# Patient Record
Sex: MALE | Race: WHITE
[De-identification: names, ages, dates, MRNs, and addresses within clinical notes are randomized per-mention and may not be internally consistent; named-entity substitution may affect disease eponyms.]

---

## 2019-04-16 ENCOUNTER — HOSPITAL ENCOUNTER (INPATIENT)
Dept: HOSPITAL 74 - FER | Age: 34
LOS: 1 days | Discharge: HOME | DRG: 343 | End: 2019-04-17
Payer: COMMERCIAL

## 2019-04-16 VITALS — BODY MASS INDEX: 32.1 KG/M2

## 2019-04-16 DIAGNOSIS — R11.0: ICD-10-CM

## 2019-04-16 DIAGNOSIS — R10.31: ICD-10-CM

## 2019-04-16 DIAGNOSIS — K35.30: Primary | ICD-10-CM

## 2019-04-16 DIAGNOSIS — R63.0: ICD-10-CM

## 2019-04-16 LAB
ALBUMIN SERPL-MCNC: 4.4 G/DL (ref 3.4–5)
ALP SERPL-CCNC: 72 U/L (ref 45–117)
ALT SERPL-CCNC: 86 U/L (ref 13–61)
ANION GAP SERPL CALC-SCNC: 6 MMOL/L (ref 8–16)
APTT BLD: 30.5 SECONDS (ref 25.2–36.5)
AST SERPL-CCNC: 36 U/L (ref 15–37)
BASOPHILS # BLD: 0.2 % (ref 0–2)
BILIRUB SERPL-MCNC: 1.4 MG/DL (ref 0.2–1)
BUN SERPL-MCNC: 12 MG/DL (ref 7–18)
CALCIUM SERPL-MCNC: 8.9 MG/DL (ref 8.5–10)
CHLORIDE SERPL-SCNC: 104 MMOL/L (ref 98–107)
CO2 SERPL-SCNC: 26 MMOL/L (ref 21–32)
CREAT SERPL-MCNC: 1 MG/DL (ref 0.55–1.3)
DEPRECATED RDW RBC AUTO: 12.4 % (ref 11.9–15.9)
EOSINOPHIL # BLD: 1.3 % (ref 0–4.5)
GLUCOSE SERPL-MCNC: 103 MG/DL (ref 74–106)
HCT VFR BLD CALC: 46.1 % (ref 35.4–49)
HGB BLD-MCNC: 15.6 GM/DL (ref 11.7–16.9)
INR BLD: 1.14 (ref 0.82–1.09)
LYMPHOCYTES # BLD: 7.5 % (ref 8–40)
MCH RBC QN AUTO: 30.4 PG (ref 25.7–33.7)
MCHC RBC AUTO-ENTMCNC: 33.8 G/DL (ref 32–35.9)
MCV RBC: 90 FL (ref 80–96)
MONOCYTES # BLD AUTO: 8.8 % (ref 3.8–10.2)
NEUTROPHILS # BLD: 82.2 % (ref 42.8–82.8)
PLATELET # BLD AUTO: 298 K/MM3 (ref 134–434)
PMV BLD: 8.5 FL (ref 7.5–11.1)
POTASSIUM SERPLBLD-SCNC: 3.8 MMOL/L (ref 3.5–5.1)
PROT SERPL-MCNC: 7.5 G/DL (ref 6.4–8.2)
PT PNL PPP: 12.7 SEC (ref 10.2–13)
RBC # BLD AUTO: 5.12 M/MM3 (ref 4–5.6)
SODIUM SERPL-SCNC: 136 MMOL/L (ref 136–145)
WBC # BLD AUTO: 17.4 K/MM3 (ref 4–10.8)

## 2019-04-16 PROCEDURE — 0DTJ4ZZ RESECTION OF APPENDIX, PERCUTANEOUS ENDOSCOPIC APPROACH: ICD-10-PCS

## 2019-04-16 NOTE — PDOC
Attending Attestation





- Resident


Resident Name: Chet Carrington





- ED Attending Attestation


I have performed the following: I have examined & evaluated the patient, The 

case was reviewed & discussed with the resident, I agree w/resident's findings 

& plan





- HPI


HPI: 





04/16/19 14:05


Healthy 34-year-old male presents with progressive abdominal pain since last 

night. Began as epigastric discomfort, over the course of the night became more 

severe and localized to the suprapubic/right lower quadrant region. Positive 

anorexia, positive nausea, baseline constipation, took laxatives and had some 

loose nonbloody stool. No fevers or chills. No surgical history.





- Physicial Exam


PE: 





04/16/19 14:05


Low-grade fever 100.2 here, vitals otherwise within normal limits


Generally well-appearing, obese, no acute distress


No jaundice or pallor, dry mucosa


Heart is regular slight tachycardia, lungs are clear


Abdomen is soft/nondistended. Tenderness with guarding in the right lower 

quadrant and right suprapubic region, positive rebound to the right lower 

quadrant.


No CVA tenderness


No hernias/LAD


 exam per resident and normal








- Medical Decision Making





04/16/19 14:06


Healthy 34-year-old male with progressive right lower quadrant pain over the 

last day, low-grade fever here with peritoneal findings localizing to the right 

lower quadrant. Presentation could be most consistent with acute appendicitis, 

hemodynamically stable.


Labs, urinalysis


CT of the abdomen and pelvis


IV fluids, pain control, nausea control, antipyretics


Reassess, surgery consult





04/16/19 15:36


+ acute appendicitis on CTAP without rupture, leukocytosis. 


iv abx, surgery consult/admission








**Heart Score/ECG Review


  ** #1


General ECG Interpretation: Sinus Rhythm, Normal Rate (87), Normal Intervals (

qtc 442), No acute ischemic changes

## 2019-04-16 NOTE — OP
Operative Note





- Note:


Operative Date: 04/16/19


Pre-Operative Diagnosis: acute appendicitis w/localized peritonitis


Operation: laparoscopic appendectomy


Findings: 





inflamed, very enlarged/bulbous appendix (mostly distal 2/3), with normal base, 

tip stuck to RLQ anterior abdominal wall; pus in pelvis - suctioned, not 

irrigated


Post-Operative Diagnosis: Same as Pre-op


Surgeon: Jaskaran Sneed


Anesthesiologist/CRNA: Kierra Carpenter


Anesthesia: General, Local (20ml 0.5% marcaine)


Specimens Removed: appendix to pathology


Estimated Blood Loss (mls): 5


Drains & Tubes with Location: Foster out at case end


Drains, Volume Out (mls): 400 (UOP)


Fluid Volume Replaced (mls): 1,500 (crystalloid)


Operative Report Dictated: Yes

## 2019-04-16 NOTE — PDOC
History of Present Illness





- General


Chief Complaint: Pain


Stated Complaint: ABD PAIN


Time Seen by Provider: 04/16/19 12:53





- History of Present Illness


Initial Comments: 





04/16/19 13:23


34m with no relevant pmh presenting with RLQ abdominal pain since last night. 


Couldn't sleep, felt bloated in the morning, took laxatives and an enema which 

help move his bowels but the pain and the feeling of being bloated 


The pain is sharp and non radiating. 


He went to an urgent care center this morning and was told to go to the ER to r/

o appendicitis. 





No previous abdominal surgery. 





Past History





- Past Medical History


Allergies/Adverse Reactions: 


 Allergies











Allergy/AdvReac Type Severity Reaction Status Date / Time


 


No Known Allergies Allergy   Verified 04/16/19 12:53











Home Medications: 


Ambulatory Orders





Bisacodyl 5 mg PO DAILY 04/16/19 


Loratadine [Claritin] 10 mg PO DAILY 04/16/19 


Simethicone [Gas-X] 125 mg PO ASDIR 04/16/19 











**Review of Systems





- Review of Systems


Able to Perform ROS?: Yes


Is the patient limited English proficient: No


Constitutional: No: Symptoms Reported


HEENTM: No: Symptoms Reported


Respiratory: No: Symptoms reported


Cardiac (ROS): No: Symptoms Reported


ABD/GI: Yes: See HPI


: No: Symptoms Reported


Musculoskeletal: No: Symptoms Reported


Integumentary: No: Symptoms Reported


Neurological: No: Symptoms reported


All Other Systems: Reviewed and Negative





*Physical Exam





- Physical Exam


General Appearance: Yes: Obese


HEENT: positive: EOMI, KENTRELL, Normal ENT Inspection


Respiratory/Chest: positive: Lungs Clear, Normal Breath Sounds.  negative: 

Chest Tender, Respiratory Distress


Cardiovascular: positive: Regular Rhythm, S1, S2, Tachycardia


Gastrointestinal/Abdominal: positive: Tender (LRQ, suprapubic), Other (Positive 

psoas and obturator signs. ).  negative: Hernia


Male Genitalia: positive: other (positive cremasteric reflex, no evidence of 

torsion. ).  negative: testicular mass


Musculoskeletal: positive: Normal Inspection.  negative: CVA Tenderness


Extremity: positive: Normal Capillary Refill, Normal Inspection, Normal Range 

of Motion


Neurologic: positive: Fully Oriented, Alert, Normal Mood/Affect, Normal Response





ED Treatment Course





- LABORATORY


CBC & Chemistry Diagram: 


 04/16/19 13:15





 04/16/19 13:15





Medical Decision Making





- Medical Decision Making





04/16/19 14:27


34f with LRQ pain. 


All signs point toward appendicitis, high suspicion. Will obtain all 

presurgical labs and ct abdo/pelvis with contrast. 


Low suspicion for testicular torsion based on exam,. 


Will obtain lipase to r/o pancreatitis despite low suspicion. 


If all negative and patient is just constipated we hydrate and treat 

accordingly. 





Acute uncomplicated appendicitis seen on CT. 


Started patient on Metrodinazole, Cefuroxime and LR, bolus and maintenance 

fluids. 





Spoke to Dr. Sneed who will operate on the patient. 


04/16/19 16:00


Patient being transferred directly to PACU at [facility]. 





*DC/Admit/Observation/Transfer


Diagnosis at time of Disposition: 


 Acute appendicitis, uncomplicated








- Discharge Dispostion


Condition at time of disposition: Stable


Decision to Admit order: Yes





- Referrals





- Patient Instructions





- Post Discharge Activity

## 2019-04-16 NOTE — HP
Admitting History and Physical





- Primary Care Physician


PCP: none





- Admission


Chief Complaint: periumbilical pain migrating to RLQ, fever, nausea


History of Present Illness: 





35yo generally healthy M presented to ER from urgent care with RLQ pain, 

worsening since last night, associated with subjective fever and chills, nausea 

but no vomiting. He thought he was initially constipated, had maybe gas pains, 

when he began having periumbilical pain yesterday afternoon, but it later 

migrated to RLQ and got worse, despite some gas medicine and a laxative. Had 

normal BM earlier yesterday, and a little more last night, but the pain did not 

get better. He did not sleep very well at all, and when he was tender this 

morning, went to urgent care, where they sent him to [facility] ER. ER workup showed 

a temp 99.9, wbc 17 and CT was positive for acute appendicitis. He was sent 

over to [facility], as [facility] does not have an OR call team and could not 

accommodate a case this evening. He is seen and examined in OR holding (PACU), 

with girlfriend present. He got Cefoxitin at [facility], and has had IV fluids. He 

is NPO since last night. 


History Source: Patient


Limitations to Obtaining History: No Limitations





- Past Medical History


ENT: Yes: Allergic Rhinitis





- Past Surgical History


Past Surgical History: Yes: Tonsillectomy


Additional Past Surgical History: 





nasal septum repair for deviation, circumcision





- Smoking History


Smoking history: Never smoked


Have you smoked in the past 12 months: No





- Alcohol/Substance Use


Hx Alcohol Use: Yes (social)


History of Substance Use: reports: None





- Social History


ADL: Independent


Occupation: works at bank





Home Medications





- Allergies


Allergies/Adverse Reactions: 


 Allergies











Allergy/AdvReac Type Severity Reaction Status Date / Time


 


No Known Allergies Allergy   Verified 04/16/19 12:53














- Home Medications


Home Medications: 


Ambulatory Orders





Bisacodyl 5 mg PO DAILY PRN 04/16/19 


Loratadine [Claritin] 10 mg PO DAILY 04/16/19 


Simethicone [Gas-X] 125 mg PO ASDIR PRN 04/16/19 








Home Medications (free text): just took claritin for 2 days, just took others 

yesterday with hpi





Family Disease History





- Family Disease History


Family Disease History: CA: Grandparent (GF, liver ca)





Review of Systems





- Review of Systems


Constitutional: reports: Chills, Fever, Loss of Appetite


Eyes: denies: Blurred Vision, Recent Change in Vision


HENT: reports: Nasal Congestion (recent - thought allergies), Other (itchy in 

ears sometimes, uses q-tips, sometimes they bleed).  denies: Difficult 

Swallowing, Throat Pain


Neck: denies: Swollen Glands, Tenderness


Cardiovascular: reports: Chest Pain (week or two ago at home).  denies: 

Palpitations


Respiratory: denies: Cough, SOB


Gastrointestinal: reports: Abdominal Pain (with hpi), Nausea.  denies: 

Constipation, Diarrhea, Vomiting


Genitourinary: denies: Burning, Dysuria


Musculoskeletal: denies: Back Pain, Joint Pain, Muscle Pain


Integumentary: denies: Change in Color, Rash


Neurological: denies: Dizziness, Headache





Physical Examination


Vital Signs: 


 Vital Signs











Temperature  98 F   04/16/19 17:00


 


Pulse Rate  86   04/16/19 17:00


 


Respiratory Rate  16   04/16/19 17:00


 


Blood Pressure  118/79   04/16/19 17:00


 


O2 Sat by Pulse Oximetry (%)  99   04/16/19 15:49











Constitutional: Yes: Well Nourished, No Distress, Calm


Eyes: Yes: Conjunctiva Clear, EOM Intact


HENT: Yes: Atraumatic, Normocephalic


Neck: Yes: Supple, Trachea Midline


Cardiovascular: Yes: Regular Rate and Rhythm, Tachycardia (slight)


Respiratory: Yes: Regular, CTA Bilaterally


Gastrointestinal: Yes: Normal Bowel Sounds, Soft, Tenderness (RLQ focal with 

local guarding only, no rebound; also referred from LLQ and RUQ).  No: 

Distention


...Rectal Exam: Yes: Deferred


Renal/: No: CVA Tenderness - Left, CVA Tenderness - Right


Musculoskeletal: No: Back Pain (no direct tenderness), Joint Stiffness, Joint 

Swelling


Extremities: No: Cool, Cyanosis


Edema: No


Peripheral Pulses WNL: Yes


Integumentary: No: Jaundice, Rash


Neurological: Yes: Alert, Oriented


Psychiatric: Yes: Alert, Oriented


Labs: 


 CBC, BMP





 04/16/19 13:15 





 04/16/19 13:15 





 CMP











Sodium  136 mmol/L (136-145)   04/16/19  13:15    


 


Potassium  3.8 mmol/L (3.5-5.1)   04/16/19  13:15    


 


Chloride  104 mmol/L ()   04/16/19  13:15    


 


Carbon Dioxide  26 mmol/L (21-32)   04/16/19  13:15    


 


Anion Gap  6 MMOL/L (8-16)  L  04/16/19  13:15    


 


BUN  12 mg/dl (7-18)   04/16/19  13:15    


 


Creatinine  1.0 mg/dl (0.55-1.3)   04/16/19  13:15    


 


Creat Clearance w eGFR  86.60  (>60)   04/16/19  13:15    


 


Random Glucose  103 mg/dl ()   04/16/19  13:15    


 


Calcium  8.9 mg/dl (8.5-10)   04/16/19  13:15    


 


Total Bilirubin  1.4 mg/dl (0.2-1)  H  04/16/19  13:15    


 


AST  36 U/L (15-37)   04/16/19  13:15    


 


ALT  86 U/L (13-61)  H  04/16/19  13:15    


 


Alkaline Phosphatase  72 U/L ()   04/16/19  13:15    


 


Total Protein  7.5 g/dl (6.4-8.2)   04/16/19  13:15    


 


Albumin  4.4 g/dl (3.4-5.0)   04/16/19  13:15    


 


Lipase  116 U/L ()   04/16/19  13:15    








 INR, PTT











INR  1.14  (0.82-1.09)   04/16/19  13:15    








 Urine Test Results











Urine Color  Yellow   04/16/19  17:30    


 


Urine Appearance  Clear   04/16/19  17:30    


 


Urine pH  7.0  (4.5-8)   04/16/19  17:30    


 


Urine Protein  Negative  (NEGATIVE)   04/16/19  17:30    


 


Urine Glucose (UA)  Negative  (NEGATIVE)   04/16/19  17:30    


 


Urine Ketones  Negative  (NEGATIVE)   04/16/19  17:30    


 


Urine Blood  Negative  (NEGATIVE)   04/16/19  17:30    


 


Urine Nitrite  Negative  (NEGATIVE)   04/16/19  17:30    


 


Urine Bilirubin  Negative  (NEGATIVE)   04/16/19  17:30    


 


Ur Leukocyte Esterase  Negative  (NEGATIVE)   04/16/19  17:30    














Imaging





- Results


Cat Scan: Report Reviewed, Image Reviewed (images reviewed - enlarged inflamed 

appendix with surrounding inflammatory changes)





Problem List





- Problems


(1) Acute appendicitis with localized peritonitis, without perforation or 

abscess


Assessment/Plan: 


admit 23H/satellite to surgery


NPO/IVF until postop


DVT prophylaxis


perioperative antibiotics


Discussed with patient risks, benefits and alternatives of laparoscopic 

possible open appendectomy, including but not limited to bleeding, infection, 

injury to adjacent structures, intestinal leak or injury, intraabdominal abscess

, incisional hernia, need for further procedures, death; alternatives include 

antibiotics, delayed or no surgery - risks of this include failure of 

nonoperative therapy, perforation, sepsis, recurrence, death.


Patient desires to proceed with operation - will take to OR for above. Informed 

consent signed for same.


Code(s): K35.30 - ACUTE APPENDICITIS WITH LOC PERITONITIS, W/O PERF OR GANGR   


Qualifiers: 


   Appendicitis gangrene presence: without gangrene   Qualified Code(s): K35.30 

- Acute appendicitis with localized peritonitis, without perforation or 

gangrene   





(2) RLQ abdominal pain


Code(s): R10.31 - RIGHT LOWER QUADRANT PAIN   





(3) Nausea alone


Code(s): R11.0 - NAUSEA   





(4) Anorexia


Code(s): R63.0 - ANOREXIA

## 2019-04-17 VITALS — DIASTOLIC BLOOD PRESSURE: 60 MMHG | HEART RATE: 84 BPM | SYSTOLIC BLOOD PRESSURE: 100 MMHG | TEMPERATURE: 97.7 F

## 2019-04-17 RX ADMIN — IBUPROFEN SCH MG: 600 TABLET, FILM COATED ORAL at 13:11

## 2019-04-17 RX ADMIN — IBUPROFEN SCH: 600 TABLET, FILM COATED ORAL at 00:36

## 2019-04-17 RX ADMIN — IBUPROFEN SCH MG: 600 TABLET, FILM COATED ORAL at 05:20

## 2019-04-17 RX ADMIN — ACETAMINOPHEN SCH MG: 325 TABLET ORAL at 08:51

## 2019-04-17 RX ADMIN — ACETAMINOPHEN SCH: 325 TABLET ORAL at 03:54

## 2019-04-17 RX ADMIN — CEFOXITIN SODIUM SCH MLS/HR: 2 INJECTION, SOLUTION INTRAVENOUS at 08:51

## 2019-04-17 RX ADMIN — CEFOXITIN SODIUM SCH MLS/HR: 2 INJECTION, SOLUTION INTRAVENOUS at 02:56

## 2019-04-17 NOTE — DS
Physical Examination


Vital Signs: 


 Vital Signs











Temperature  97.7 F   04/17/19 06:19


 


Pulse Rate  84   04/17/19 06:19


 


Respiratory Rate  20   04/17/19 06:19


 


Blood Pressure  100/60   04/17/19 06:19


 


O2 Sat by Pulse Oximetry (%)  96   04/17/19 05:25











Findings/Remarks: 





Seen and examined in bed with girlfriend present. Has ambulated, voiding lighter

, no gas or BM yet, pain manageable with tyl/ibu alternating. Tolerating diet. 


Constitutional: Yes: Well Nourished, No Distress, Calm


Eyes: Yes: Conjunctiva Clear, EOM Intact


HENT: Yes: Atraumatic, Normocephalic


Neck: Yes: Supple, Trachea Midline


Cardiovascular: Yes: Tachycardia.  No: Pulse Irregular (mild)


Respiratory: Yes: Regular, CTA Bilaterally


Gastrointestinal: Yes: Normal Bowel Sounds, Soft, Distention (mild), Tenderness 

(minimal RLQ, + incisional, appropriate postop)


...Rectal Exam: Yes: Deferred


Extremities: No: Cool, Cyanosis


Integumentary: Yes: Incision (x3 dressed).  No: Jaundice, Rash


Wound/Incision: Yes: Steri Strips (under dressings), Dressing Dry and Intact (x3

).  No: Dressing Removed


Neurological: Yes: Alert, Oriented


Labs: 


 no new labs





Discharge Summary


Reason For Visit: ACUTE APPENDICITIS





Current Active Problems





Acute appendicitis with localized peritonitis, without perforation or abscess (

Acute)


RLQ abdominal pain (Acute)


Nausea alone (Acute)


Anorexia (Acute)








Procedures: Principal: laparoscopic appendectomy


Hospital Course: 





35yo generally healthy M presented to ER with periumbilical pain migrating to 

RLQ, associated with subjective fever/chills and nausea, and was found to have 

wbc 17 and CT showed acute appendicitis. He was taken for laparoscopic 

appendectomy and given perioperative antibiotics. Postop, he has tolerated diet

, is ambulating and voiding, no gas or BM yet, and tolerating diet. Pain is 

manageable with alternating tylenol and ibuprofen. He is discharged home with 

lifting restrictions to f/u in 2 wks. Referred to possible PMDs for medical 

followup. 


Time spent on discharge: 35 minutes


Condition: Good





- Instructions


Diet, Activity, Other Instructions: 


Postoperative instructions: You had a laparoscopic appendectomy on 4/16/19 by 

Dr. Jaskaran Sneed of [facility].


 


Activity: Resume your usual activities gradually, but no heavy exertion or 

lifting more than 10-15 pounds for 1 month. Remove dressings 48 hours after 

surgery; sticky tapes underneath will fall off by themselves. You may shower 

daily starting then, just pat the incision areas dry. No bath or swimming until 

skin incisions have healed. Eat lightly at first, but advance to your usual 

diet as tolerated.


 


Pain: For pain, you may use and alternate Tylenol (acetaminophen) 1-2 pills and/

or ibuprofen 200 mg (1-3 pills) every 6 hours each as needed; this means that 

you can take one OR the other at 3-hour intervals. If you are prescribed a 

Tylenol/narcotic combination for severe pain, use it instead of plain Tylenol 

as needed and switch back when your pain starts decreasing. Do not take more 

than 4000mg of acetaminophen in a day. Take medications as prescribed or 

indicated on the labeling.


 


Follow-up: Call Dr. Sneed's office at [phone] to make your postop 

appointment (Wednesday in approximately 2 weeks after surgery). Clinic is held 

in the [facility] on the first floor of [facility].


 


Call the office if you have:


* increasing pain not responsive to pain medication


* fever of 101F or higher


* vomiting


* unusual or increasing bleeding or drainage from wounds


* increasing redness or swelling at wound sites


* inability to urinate





Also, see your primary medical doctor or call for an appointment with a 

physician you have been referred to within 1-2 weeks.


Referrals: 


Eleazar Brown MD [Staff Physician] - 


Sivakumar Ocampo MD [Staff Physician] - 


Disposition: HOME





- Home Medications


Comprehensive Discharge Medication List: 


Ambulatory Orders





Loratadine [Claritin] 10 mg PO DAILY 04/16/19 


Acetaminophen [Tylenol .Regular Strength -] 650 mg PO Q6H  tablet 04/17/19 


Ibuprofen [Motrin -] 600 mg PO Q6H  tablet 04/17/19

## 2019-04-18 NOTE — PATH
Surgical Pathology Report



Patient Name:  YOLY ROSALES

Accession #:  [id number]

[facility]. Rec. #:  [id number]                                                        

   /Age/Gender:  1985 (Age: 34) / M

Account:  [id number]                                                          

             Location: [address] [place] [address]

Taken:  2019

Received:  2019

Reported:  2019

Physicians:  Jaskaran Sneed M.D.

  



Specimen(s) Received

 APPENDIX 





Clinical History

Acute appendicitis with localized peritonitis







Final Diagnosis

APPENDIX, LAPAROSCOPIC APPENDECTOMY:

ACUTE APPENDICITIS AND PERIAPPENDICITIS.





***Electronically Signed***

Rachel Fajardo M.D.





Gross Description

Received in formalin, labeled "appendix," is a 7 cm. in length vermiform

appendix with a stapled margin of resection and moderate attached fat. The

serosa is tan-grey with attached exudate. Sectioning reveals a focally dilated

lumen containing red blood. The wall of the appendix averages 0.2 cm. in

thickness. Representative sections are submitted in 2 cassettes. 

/2019



saudi/[email or web address]